# Patient Record
Sex: MALE | Race: WHITE | NOT HISPANIC OR LATINO | Employment: UNEMPLOYED | URBAN - NONMETROPOLITAN AREA
[De-identification: names, ages, dates, MRNs, and addresses within clinical notes are randomized per-mention and may not be internally consistent; named-entity substitution may affect disease eponyms.]

---

## 2022-05-11 ENCOUNTER — APPOINTMENT (OUTPATIENT)
Dept: GENERAL RADIOLOGY | Facility: HOSPITAL | Age: 57
End: 2022-05-11

## 2022-05-11 ENCOUNTER — HOSPITAL ENCOUNTER (EMERGENCY)
Facility: HOSPITAL | Age: 57
Discharge: HOME | End: 2022-05-11
Attending: EMERGENCY MEDICINE | Admitting: EMERGENCY MEDICINE

## 2022-05-11 VITALS
OXYGEN SATURATION: 96 % | TEMPERATURE: 98.2 F | DIASTOLIC BLOOD PRESSURE: 83 MMHG | HEART RATE: 96 BPM | BODY MASS INDEX: 31.5 KG/M2 | WEIGHT: 220 LBS | RESPIRATION RATE: 18 BRPM | HEIGHT: 70 IN | SYSTOLIC BLOOD PRESSURE: 108 MMHG

## 2022-05-11 DIAGNOSIS — S61.210A LACERATION OF RIGHT INDEX FINGER WITHOUT FOREIGN BODY WITHOUT DAMAGE TO NAIL, INITIAL ENCOUNTER: Primary | ICD-10-CM

## 2022-05-11 PROCEDURE — 73140 X-RAY EXAM OF FINGER(S): CPT

## 2022-05-11 PROCEDURE — 99283 EMERGENCY DEPT VISIT LOW MDM: CPT

## 2022-05-11 RX ORDER — IBUPROFEN 200 MG
1 TABLET ORAL ONCE
Status: COMPLETED | OUTPATIENT
Start: 2022-05-11 | End: 2022-05-11

## 2022-05-11 RX ORDER — HYDROCODONE BITARTRATE AND ACETAMINOPHEN 5; 325 MG/1; MG/1
1 TABLET ORAL ONCE
Status: COMPLETED | OUTPATIENT
Start: 2022-05-11 | End: 2022-05-11

## 2022-05-11 RX ADMIN — Medication 1 APPLICATION: at 16:23

## 2022-05-11 RX ADMIN — HYDROCODONE BITARTRATE AND ACETAMINOPHEN 1 TABLET: 5; 325 TABLET ORAL at 15:14

## 2022-05-11 NOTE — ED PROVIDER NOTES
Subjective   56-year-old male presents with an injury to his right small finger on the palm surface.  He states that he injured it with a .  The high flow water pressure ran across his small finger on the palm surface pushing out the skin and tissue and he now has a numbness sensation in the tip of the finger that he cannot bend the finger.      History provided by:  Patient   used: No        Review of Systems   Musculoskeletal:        Finger injury right small finger palm surface   All other systems reviewed and are negative.      Past Medical History:   Diagnosis Date   • Disease of thyroid gland    • Hyperlipidemia    • Hypertension        No Known Allergies    History reviewed. No pertinent surgical history.    History reviewed. No pertinent family history.    Social History     Socioeconomic History   • Marital status:    Tobacco Use   • Smoking status: Current Every Day Smoker     Packs/day: 0.50     Types: Cigarettes   Vaping Use   • Vaping Use: Never used   Substance and Sexual Activity   • Alcohol use: Not Currently   • Drug use: Never   • Sexual activity: Defer           Objective   Physical Exam  Vitals and nursing note reviewed.   Constitutional:       Appearance: He is well-developed.   HENT:      Head: Normocephalic and atraumatic.   Cardiovascular:      Rate and Rhythm: Normal rate and regular rhythm.   Pulmonary:      Effort: Pulmonary effort is normal.      Breath sounds: Normal breath sounds.   Musculoskeletal:        Hands:       Cervical back: Normal range of motion.      Comments: Open wound 1 cm palm surface right small finger   Skin:     General: Skin is warm and dry.   Neurological:      Mental Status: He is alert and oriented to person, place, and time.   Psychiatric:         Behavior: Behavior normal.         Thought Content: Thought content normal.         Judgment: Judgment normal.         Procedures           ED Course         laceration repair  not performed                                        MDM  Number of Diagnoses or Management Options  Laceration of right index finger without foreign body without damage to nail, initial encounter: new and requires workup     Amount and/or Complexity of Data Reviewed  Tests in the radiology section of CPT®: reviewed    Risk of Complications, Morbidity, and/or Mortality  Presenting problems: minimal  Management options: minimal    Patient Progress  Patient progress: stable      Final diagnoses:   Laceration of right index finger without foreign body without damage to nail, initial encounter       ED Disposition  ED Disposition     ED Disposition   Discharge    Condition   Stable    Comment   --             [facility] Emergency Department  [address] [facility] [address] [phone]    If symptoms worsen    Geronimo Larkin MD  [address] [id number] [phone]    Schedule an appointment as soon as possible for a visit            Medication List      No changes were made to your prescriptions during this visit.          Joel Dominique Jr., PA-C  05/11/22 1638       Joel Dominique Jr., PA-C  05/17/22 105

## 2023-02-14 ENCOUNTER — TRANSCRIBE ORDERS (OUTPATIENT)
Dept: NEUROLOGY | Facility: HOSPITAL | Age: 58
End: 2023-02-14
Payer: MEDICAID

## 2023-02-14 DIAGNOSIS — N18.32 STAGE 3B CHRONIC KIDNEY DISEASE: Primary | ICD-10-CM

## 2023-04-05 ENCOUNTER — HOSPITAL ENCOUNTER (OUTPATIENT)
Dept: ULTRASOUND IMAGING | Facility: HOSPITAL | Age: 58
Discharge: HOME | End: 2023-04-05
Admitting: NURSE PRACTITIONER
Payer: MEDICAID

## 2023-04-05 DIAGNOSIS — N18.32 STAGE 3B CHRONIC KIDNEY DISEASE: ICD-10-CM

## 2023-04-05 PROCEDURE — 76775 US EXAM ABDO BACK WALL LIM: CPT

## 2024-06-24 ENCOUNTER — HOSPITAL ENCOUNTER (EMERGENCY)
Facility: HOSPITAL | Age: 59
Discharge: HOME | End: 2024-06-24
Attending: EMERGENCY MEDICINE | Admitting: EMERGENCY MEDICINE
Payer: MEDICAID

## 2024-06-24 VITALS
DIASTOLIC BLOOD PRESSURE: 82 MMHG | RESPIRATION RATE: 18 BRPM | HEIGHT: 70 IN | BODY MASS INDEX: 28.92 KG/M2 | WEIGHT: 202 LBS | SYSTOLIC BLOOD PRESSURE: 128 MMHG | HEART RATE: 64 BPM | TEMPERATURE: 98.3 F | OXYGEN SATURATION: 98 %

## 2024-06-24 DIAGNOSIS — K04.7 DENTAL ABSCESS: Primary | ICD-10-CM

## 2024-06-24 PROCEDURE — 96372 THER/PROPH/DIAG INJ SC/IM: CPT

## 2024-06-24 PROCEDURE — 25010000002 KETOROLAC TROMETHAMINE PER 15 MG: Performed by: EMERGENCY MEDICINE

## 2024-06-24 PROCEDURE — 99283 EMERGENCY DEPT VISIT LOW MDM: CPT

## 2024-06-24 RX ORDER — KETOROLAC TROMETHAMINE 10 MG/1
10 TABLET, FILM COATED ORAL EVERY 6 HOURS PRN
Qty: 15 TABLET | Refills: 0 | Status: SHIPPED | OUTPATIENT
Start: 2024-06-24

## 2024-06-24 RX ORDER — PENICILLIN V POTASSIUM 250 MG/1
500 TABLET ORAL ONCE
Status: DISCONTINUED | OUTPATIENT
Start: 2024-06-24 | End: 2024-06-24

## 2024-06-24 RX ORDER — AMOXICILLIN AND CLAVULANATE POTASSIUM 875; 125 MG/1; MG/1
1 TABLET, FILM COATED ORAL ONCE
Status: COMPLETED | OUTPATIENT
Start: 2024-06-24 | End: 2024-06-24

## 2024-06-24 RX ORDER — KETOROLAC TROMETHAMINE 30 MG/ML
30 INJECTION, SOLUTION INTRAMUSCULAR; INTRAVENOUS ONCE
Status: COMPLETED | OUTPATIENT
Start: 2024-06-24 | End: 2024-06-24

## 2024-06-24 RX ORDER — PENICILLIN V POTASSIUM 500 MG/1
500 TABLET ORAL 4 TIMES DAILY
Qty: 40 TABLET | Refills: 0 | Status: SHIPPED | OUTPATIENT
Start: 2024-06-24 | End: 2024-07-04

## 2024-06-24 RX ORDER — LIDOCAINE HYDROCHLORIDE 20 MG/ML
5 SOLUTION OROPHARYNGEAL
Status: DISCONTINUED | OUTPATIENT
Start: 2024-06-24 | End: 2024-06-25 | Stop reason: HOSPADM

## 2024-06-24 RX ORDER — LIDOCAINE HYDROCHLORIDE 20 MG/ML
SOLUTION OROPHARYNGEAL
Status: COMPLETED
Start: 2024-06-24 | End: 2024-06-24

## 2024-06-24 RX ADMIN — LIDOCAINE HYDROCHLORIDE: 20 SOLUTION ORAL at 22:53

## 2024-06-24 RX ADMIN — LIDOCAINE HYDROCHLORIDE: 20 SOLUTION OROPHARYNGEAL at 22:53

## 2024-06-24 RX ADMIN — KETOROLAC TROMETHAMINE 30 MG: 30 INJECTION, SOLUTION INTRAMUSCULAR; INTRAVENOUS at 22:34

## 2024-06-24 RX ADMIN — AMOXICILLIN AND CLAVULANATE POTASSIUM 1 TABLET: 875; 125 TABLET, FILM COATED ORAL at 22:34

## 2024-06-25 NOTE — FSED PROVIDER NOTE
Subjective   History of Present Illness  Patient presents to the emergency department for left upper sided dental pain.  Pain began yesterday.  Has a history of dental caries and says that he does not see a dentist in years.  He denies nausea or vomiting no change in voice.  No change in vision.  Says his face felt swollen.  He denies any difficulty breathing or other symptoms no known trauma.  Does not feel any teeth break or other injury    History provided by:  Patient   used: No        Review of Systems   HENT:  Positive for dental problem.    All other systems reviewed and are negative.      Past Medical History:   Diagnosis Date    Disease of thyroid gland     Hyperlipidemia     Hypertension        No Known Allergies    History reviewed. No pertinent surgical history.    History reviewed. No pertinent family history.    Social History     Socioeconomic History    Marital status:    Tobacco Use    Smoking status: Every Day     Current packs/day: 0.50     Types: Cigarettes   Vaping Use    Vaping status: Never Used   Substance and Sexual Activity    Alcohol use: Not Currently    Drug use: Never    Sexual activity: Defer           Objective   Physical Exam  Vitals and nursing note reviewed.   Constitutional:       Appearance: Normal appearance.   HENT:      Head: Normocephalic and atraumatic.      Nose: Nose normal.      Mouth/Throat:      Mouth: Mucous membranes are moist.      Dentition: Gingival swelling (Left upper molars, mild fluctuance) and dental caries (Severe dental caries diffusely with multiple missing teeth) present.      Tongue: No lesions. Tongue does not deviate from midline.      Pharynx: Oropharynx is clear.   Eyes:      Extraocular Movements: Extraocular movements intact.      Pupils: Pupils are equal, round, and reactive to light.   Cardiovascular:      Rate and Rhythm: Normal rate and regular rhythm.      Pulses: Normal pulses.      Heart sounds: Normal heart sounds.    Pulmonary:      Effort: No respiratory distress.      Breath sounds: Normal breath sounds. No stridor.   Abdominal:      General: There is no distension.      Palpations: Abdomen is soft.      Tenderness: There is no abdominal tenderness. There is no guarding.   Musculoskeletal:         General: No swelling, tenderness or deformity. Normal range of motion.      Cervical back: Full passive range of motion without pain, normal range of motion and neck supple.   Skin:     General: Skin is warm and dry.      Capillary Refill: Capillary refill takes less than 2 seconds.   Neurological:      General: No focal deficit present.      Mental Status: He is alert and oriented to person, place, and time.      Cranial Nerves: No cranial nerve deficit.   Psychiatric:         Mood and Affect: Mood normal.         Behavior: Behavior normal.         Incision & Drainage    Date/Time: 6/24/2024 10:28 PM    Performed by: Elmer Ly MD  Authorized by: Elmer Ly MD    Consent:     Consent obtained:  Verbal    Consent given by:  Patient    Risks discussed:  Incomplete drainage, bleeding and pain    Alternatives discussed:  No treatment and referral  Universal protocol:     Procedure explained and questions answered to patient or proxy's satisfaction: yes      Relevant documents present and verified: yes      Test results available : yes      Imaging studies available: yes      Required blood products, implants, devices, and special equipment available: yes      Site/side marked: yes      Immediately prior to procedure, a time out was called: yes      Patient identity confirmed:  Verbally with patient  Location:     Type:  Abscess    Size:  0.5 cm    Location: Left upper gingiva.  Sedation:     Sedation type:  None  Anesthesia:     Anesthesia method:  Topical application  Procedure type:     Complexity:  Simple  Procedure details:     Incision types:  Stab incision    Incision depth:  Subcutaneous    Drainage:  Purulent     Drainage amount:  Scant    Packing materials:  None  Post-procedure details:     Procedure completion:  Tolerated             ED Course                                           Medical Decision Making  Hemodynamically stable and afebrile.  Patient has a small gingival abscess.  This was drained at bedside.  He has poor dentition will need follow-up with dentistry.  Will give antibiotic first dose here as well as Toradol.  Given pain control and antibiotics for home with follow-up instructions.  Discharge    Risk  Prescription drug management.        Final diagnoses:   None       ED Disposition  ED Disposition       ED Disposition   Discharge    Condition   Stable    Comment   --               Ba Adams APRN  [address] [place] [address]  [phone]    Schedule an appointment as soon as possible for a visit       [facility] EMERGENCY DEPARTMENT [place]  [address]  [facility] [address] [phone]  Go to   As needed     DENTAL CLINIC  [address]  [facility] [id number] [phone]  Go in 2 days           Medication List        New Prescriptions      ketorolac 10 MG tablet  Commonly known as: TORADOL  Take 1 tablet by mouth Every 6 (Six) Hours As Needed for Moderate Pain.     penicillin v potassium 500 MG tablet  Commonly known as: VEETID  Take 1 tablet by mouth 4 (Four) Times a Day for 10 days.               Where to Get Your Medications        These medications were sent to [facility] [place] - [address] - [phone]  - [phone] FX  [address] [place] [address]      Phone: [phone]   ketorolac 10 MG tablet  penicillin v potassium 500 MG tablet

## 2025-02-28 ENCOUNTER — TRANSCRIBE ORDERS (OUTPATIENT)
Dept: ADMINISTRATIVE | Facility: HOSPITAL | Age: 60
End: 2025-02-28
Payer: MEDICAID

## 2025-02-28 ENCOUNTER — HOSPITAL ENCOUNTER (OUTPATIENT)
Dept: GENERAL RADIOLOGY | Facility: HOSPITAL | Age: 60
Discharge: HOME | End: 2025-02-28
Payer: MEDICAID

## 2025-02-28 DIAGNOSIS — R52 PAIN: Primary | ICD-10-CM

## 2025-02-28 DIAGNOSIS — R52 PAIN: ICD-10-CM

## 2025-02-28 PROCEDURE — 73630 X-RAY EXAM OF FOOT: CPT

## 2025-03-05 ENCOUNTER — OFFICE VISIT (OUTPATIENT)
Dept: UROLOGY | Facility: CLINIC | Age: 60
End: 2025-03-05
Payer: MEDICAID

## 2025-03-05 ENCOUNTER — LAB (OUTPATIENT)
Dept: LAB | Facility: HOSPITAL | Age: 60
End: 2025-03-05
Payer: MEDICAID

## 2025-03-05 VITALS
WEIGHT: 224.8 LBS | SYSTOLIC BLOOD PRESSURE: 120 MMHG | BODY MASS INDEX: 32.18 KG/M2 | OXYGEN SATURATION: 93 % | HEART RATE: 90 BPM | TEMPERATURE: 97.9 F | RESPIRATION RATE: 16 BRPM | DIASTOLIC BLOOD PRESSURE: 62 MMHG | HEIGHT: 70 IN

## 2025-03-05 DIAGNOSIS — R31.29 MICROSCOPIC HEMATURIA: ICD-10-CM

## 2025-03-05 DIAGNOSIS — Z72.0 TOBACCO USE: ICD-10-CM

## 2025-03-05 DIAGNOSIS — R31.29 MICROSCOPIC HEMATURIA: Primary | ICD-10-CM

## 2025-03-05 PROBLEM — E55.9 VITAMIN D DEFICIENCY DUE TO CHRONIC KIDNEY DISEASE: Status: ACTIVE | Noted: 2023-02-07

## 2025-03-05 PROBLEM — N25.81 SECONDARY HYPERPARATHYROIDISM OF RENAL ORIGIN: Chronic | Status: ACTIVE | Noted: 2023-02-07

## 2025-03-05 PROBLEM — E78.5 DYSLIPIDEMIA: Status: ACTIVE | Noted: 2023-02-06

## 2025-03-05 PROBLEM — G89.4 CHRONIC PAIN DISORDER: Status: ACTIVE | Noted: 2023-02-07

## 2025-03-05 PROBLEM — E11.22 TYPE 2 DIABETES MELLITUS WITH DIABETIC CHRONIC KIDNEY DISEASE: Chronic | Status: ACTIVE | Noted: 2023-02-06

## 2025-03-05 PROBLEM — R60.0 PERIPHERAL EDEMA: Status: ACTIVE | Noted: 2023-02-06

## 2025-03-05 PROBLEM — I12.9 BENIGN HYPERTENSION WITH CHRONIC KIDNEY DISEASE: Status: ACTIVE | Noted: 2023-02-06

## 2025-03-05 PROBLEM — N18.9 VITAMIN D DEFICIENCY DUE TO CHRONIC KIDNEY DISEASE: Status: ACTIVE | Noted: 2023-02-07

## 2025-03-05 PROBLEM — N18.32 STAGE 3B CHRONIC KIDNEY DISEASE: Status: ACTIVE | Noted: 2023-02-06

## 2025-03-05 PROBLEM — M54.50 LOW BACK PAIN: Status: ACTIVE | Noted: 2024-09-24

## 2025-03-05 PROBLEM — I82.409 DEEP VENOUS THROMBOSIS: Status: ACTIVE | Noted: 2023-02-07

## 2025-03-05 LAB
BILIRUB BLD-MCNC: ABNORMAL MG/DL
CLARITY, POC: CLEAR
COLOR UR: YELLOW
EXPIRATION DATE: ABNORMAL
GLUCOSE UR STRIP-MCNC: NEGATIVE MG/DL
KETONES UR QL: ABNORMAL
LEUKOCYTE EST, POC: NEGATIVE
Lab: ABNORMAL
NITRITE UR-MCNC: NEGATIVE MG/ML
PH UR: 5.5 [PH] (ref 5–8)
PROT UR STRIP-MCNC: ABNORMAL MG/DL
RBC # UR STRIP: ABNORMAL /UL
SP GR UR: 1.02 (ref 1–1.03)
UROBILINOGEN UR QL: NORMAL

## 2025-03-05 PROCEDURE — 84153 ASSAY OF PSA TOTAL: CPT

## 2025-03-05 PROCEDURE — 36415 COLL VENOUS BLD VENIPUNCTURE: CPT

## 2025-03-05 RX ORDER — OMEGA-3 FATTY ACIDS/FISH OIL 300-1000MG
CAPSULE ORAL
COMMUNITY

## 2025-03-05 RX ORDER — HYDROCODONE BITARTRATE AND ACETAMINOPHEN 7.5; 325 MG/1; MG/1
TABLET ORAL
COMMUNITY
Start: 2024-11-26

## 2025-03-05 RX ORDER — SIMVASTATIN 20 MG
TABLET ORAL
COMMUNITY

## 2025-03-05 RX ORDER — HYDROCHLOROTHIAZIDE 25 MG/1
TABLET ORAL
COMMUNITY

## 2025-03-05 RX ORDER — LEVOTHYROXINE SODIUM 75 UG/1
75 TABLET ORAL
COMMUNITY
Start: 2025-03-04

## 2025-03-05 RX ORDER — LOSARTAN POTASSIUM 100 MG/1
100 TABLET ORAL DAILY
COMMUNITY

## 2025-03-05 NOTE — PROGRESS NOTES
Office Visit     Patient Name: Adrián Buenrostro  : 1965   MRN: [id number]   Patient or patient representative verbalized consent for the use of Ambient Listening during the visit with  TREVIN Lainez for chart documentation. 3/5/2025  15:24 EST    Chief Complaint   Patient presents with    microscopic hematuria    Establish Care     Referring Provider: No ref. provider found    Primary Care Provider: Esequiel Liang MD     History of Present Illness  The patient is a 59-year-old male presenting for evaluation of microscopic hematuria.    Microscopic Hematuria  - Referred by his nephrologist for microscopic hematuria. Last microscopic urinalysis was negative in December.  - No gross hematuria   - No history of renal calculi.  - Last imaging study 2 years ago.    Smoking History  - Smoker since early adulthood, half a pack per day.  - No secondhand smoke exposure.  - No vapor cigarette use.  - No smoking cessation attempts.    Supplemental information: No known PSA test.    FAMILY HISTORY  Father had prostate cancer diagnosed in late 60s. No family history of bladder or kidney cancer.     Subjective   Review of System: As noted in HPI.    Past Medical History:   Diagnosis Date    Chronic kidney disease     Disease of thyroid gland     Hyperlipidemia     Hypertension      History reviewed. No pertinent surgical history.  Family History   Problem Relation Age of Onset    Prostate cancer Father 68    Kidney disease Mother     Kidney cancer Neg Hx         no bladder cancer    Testicular cancer Neg Hx      Social History     Socioeconomic History    Marital status:    Tobacco Use    Smoking status: Every Day     Current packs/day: 0.50     Average packs/day: 0.5 packs/day for 35.2 years (17.6 ttl pk-yrs)     Types: Cigarettes     Start date:      Passive exposure: Past    Smokeless tobacco: Never   Vaping Use    Vaping status: Never Used   Substance and Sexual Activity    Alcohol use: Not  "Currently    Drug use: Never    Sexual activity: Not Currently     Partners: Female       Current Outpatient Medications:     hydroCHLOROthiazide 25 MG tablet, take one (1) tablet by mouth every day, Disp: , Rfl:     HYDROcodone-acetaminophen (NORCO) 7.5-325 MG per tablet, Take 1 tablet twice a day by oral route as needed for 30 days, for pain., Disp: , Rfl:     levothyroxine (SYNTHROID, LEVOTHROID) 75 MCG tablet, Take 1 tablet by mouth Every Morning., Disp: , Rfl:     losartan (COZAAR) 100 MG tablet, Take 1 tablet by mouth Daily., Disp: , Rfl:     Rivaroxaban (XARELTO PO), Take  by mouth., Disp: , Rfl:     simvastatin (ZOCOR) 20 MG tablet, take one (1) tablet by mouth every day in the evening, Disp: , Rfl:     Ibuprofen 200 MG capsule, Take  by mouth. (Patient not taking: Reported on 3/5/2025), Disp: , Rfl:     No Known Allergies  Objective   Visit Vitals  /62 (BP Location: Left arm, Patient Position: Sitting, Cuff Size: Adult)   Pulse 90   Temp 97.9 °F (36.6 °C) (Temporal)   Resp 16   Ht 176.6 cm (69.53\")   Wt 102 kg (224 lb 12.8 oz)   SpO2 93%   BMI 32.69 kg/m²      Body mass index is 32.69 kg/m².   Physical Exam  Vitals and nursing note reviewed.   Constitutional:       General: He is not in acute distress.     Appearance: Normal appearance. He is not ill-appearing.   HENT:      Head: Normocephalic and atraumatic.      Mouth/Throat:      Lips: Pink.      Dentition: Abnormal dentition (missing teeth).   Pulmonary:      Effort: Pulmonary effort is normal.   Skin:     General: Skin is warm and dry.   Neurological:      General: No focal deficit present.      Mental Status: He is alert and oriented to person, place, and time.   Psychiatric:         Mood and Affect: Mood normal.         Behavior: Behavior normal.      Labs  No results found for: \"COLORU\", \"CLARITYU\", \"SPECGRAV\", \"PHUR\", \"LEUKOCYTESUR\", \"NITRITE\", \"PROTEINPOCUA\", \"GLUCOSEUR\", \"KETONESU\", \"UROBILINOGEN\", \"BILIRUBINUR\", \"RBCUR\"   No results found " "for: \"WBCUA\", \"RBCUA\", \"BACTERIA\", \"HYALCASTU\", \"SQUAMEPIUA\"     No results found for: \"WBC\", \"HGB\", \"HCT\", \"MCV\", \"PLT\"  No results found for: \"GLUCOSE\", \"CALCIUM\", \"NA\", \"K\", \"CO2\", \"CL\", \"BUN\", \"CREATININE\", \"EGFR\", \"BCR\", \"ANIONGAP\", \"ALT\", \"AST\"  No results found for: \"HGBA1C\"  No results found for: \"PSA\", \"PCTFREEPSA\", \"PROSTATEBIO\"  No results found for: \"URICACIDSTN\", \"EDTU8OAFWKF\", \"KNDE5FLOUF\", \"LABMAGN\"  No results found for: \"GAZG02AU\", \"CAION\", \"PTH\", \"URICACID\"  No results found for: \"CYSTINE\", \"URINEVOLUM\", \"CALCIUMUR\", \"OXALATEU\", \"CITRATEUR\", \"LABPH\", \"URICUR\", \"NAUR\", \"KUR\", \"MAGNESIUMUR\", \"PHOSUR\", \"AMMONIUMUR\", \"CLUR\", \"AUJCXBZYK94S\", \"UREAUR\", \"LABCREAUR\"  No results found for: \"TESTOSTEROTT\", \"TESTFRE\", \"PSA\", \"ESTRADIOL\", \"LH\", \"PROLACTIN\", \"FSH\"    Radiographic Studies  XR Foot 3+ View Right    Result Date: 2/28/2025  Chronic change of the calcaneus as described. No prior exam for comparison.  No acute osseous abnormality.    Images were reviewed, interpreted, and dictated by Dr. Ольга Vila MD Transcribed by Fatimah Gonzalez PA-C.  This report was signed and finalized on 2/28/2025 11:00 AM by Ольга Vila MD.      I have reviewed the above labs and imaging.    Assessment / Plan    Diagnoses and all orders for this visit:    1. Microscopic hematuria (Primary)  -     POC Urinalysis Dipstick, Automated  -     PSA Diagnostic; Future  -     Urinalysis With Microscopic - Urine, Clean Catch; Future    2. Tobacco use       Assessment & Plan  1. Microscopic hematuria  - History of microscopic hematuria, recent urinalysis shows blood  - Previous urine microscopy in December was negative, October's was contaminated  - Increased risk of urothelial cancer due to 17 pack-years smoking history and second hand smoke exposure.    - Repeat urine microscopy to confirm blood and rule out contamination  - If positive, recommend imaging for masses, tumors, or kidney stones  - Coordinate with Dr. Schwartz's group " for pre and post-hydration if imaging required    2. Increased risk of prostate cancer  - Increased risk due to family history (father diagnosed in late 60s)  - PSA normal in 2022 per provider note. No labs to review and confirm.    - Order repeat PSA test today  - No digital rectal exam to avoid falsely elevating PSA    3. Smoking  - 17 pack-years smoking history, no smoking cessation attempts  - Discussed risks including bladder cancer  - Encouraged smoking cessation       Return for f/u with Brenna on Monday of next week to review labs.  .    Brenna Martinez, MSN, APRN, FNP-C  [facility]

## 2025-03-06 LAB — PSA SERPL-MCNC: 0.82 NG/ML (ref 0–4)

## 2025-03-10 ENCOUNTER — OFFICE VISIT (OUTPATIENT)
Dept: UROLOGY | Facility: CLINIC | Age: 60
End: 2025-03-10
Payer: MEDICAID

## 2025-03-10 VITALS
RESPIRATION RATE: 14 BRPM | TEMPERATURE: 97.2 F | WEIGHT: 224 LBS | BODY MASS INDEX: 32.58 KG/M2 | HEART RATE: 85 BPM | OXYGEN SATURATION: 96 %

## 2025-03-10 DIAGNOSIS — Z80.42 FAMILY HISTORY OF PROSTATE CANCER IN FATHER: ICD-10-CM

## 2025-03-10 DIAGNOSIS — R31.9 HEMATURIA, UNSPECIFIED TYPE: Primary | ICD-10-CM

## 2025-03-10 DIAGNOSIS — Z79.01 ANTICOAGULANT LONG-TERM USE: ICD-10-CM

## 2025-03-10 DIAGNOSIS — Z72.0 TOBACCO USE: ICD-10-CM

## 2025-03-10 NOTE — PROGRESS NOTES
Office Visit     Patient Name: Adrián Buenrostro  : 1965   MRN: [id number]   Patient or patient representative verbalized consent for the use of Ambient Listening during the visit with  TREVIN Lainez for chart documentation. 3/10/2025  08:02 EDT    Chief Complaint   Patient presents with    Results    Blood in Urine     Referring Provider: No ref. provider found    Primary Care Provider: Esequiel Liang MD     History of Present Illness  The patient is a 59-year-old male here to review recent labs, including PSA and urine microscopy.    Microscopic Hematuria  - History of microscopic hematuria with negative urine microscopies in the past.  No gross hematuria.    - On Xarelto     Family history of prostate cancer   - Family history of prostate cancer (father diagnosed in his 60s)    Chronic Back Pain  - Reports chronic back pain currently taking a Medrol dose pack.       Subjective   Review of System: As noted in HPI.    Past Medical History:   Diagnosis Date    Chronic kidney disease     Disease of thyroid gland     Hyperlipidemia     Hypertension      History reviewed. No pertinent surgical history.  Family History   Problem Relation Age of Onset    Prostate cancer Father 68    Kidney disease Mother     Kidney cancer Neg Hx         no bladder cancer    Testicular cancer Neg Hx      Social History     Socioeconomic History    Marital status:    Tobacco Use    Smoking status: Every Day     Current packs/day: 0.50     Average packs/day: 0.5 packs/day for 35.2 years (17.6 ttl pk-yrs)     Types: Cigarettes     Start date: 1990     Passive exposure: Past    Smokeless tobacco: Never   Vaping Use    Vaping status: Never Used   Substance and Sexual Activity    Alcohol use: Not Currently    Drug use: Never    Sexual activity: Not Currently     Partners: Female       Current Outpatient Medications:     hydroCHLOROthiazide 25 MG tablet, take one (1) tablet by mouth every day, Disp: , Rfl:      HYDROcodone-acetaminophen (NORCO) 7.5-325 MG per tablet, Take 1 tablet twice a day by oral route as needed for 30 days, for pain., Disp: , Rfl:     levothyroxine (SYNTHROID, LEVOTHROID) 75 MCG tablet, Take 1 tablet by mouth Every Morning., Disp: , Rfl:     losartan (COZAAR) 100 MG tablet, Take 1 tablet by mouth Daily., Disp: , Rfl:     Rivaroxaban (XARELTO PO), Take  by mouth., Disp: , Rfl:     simvastatin (ZOCOR) 20 MG tablet, take one (1) tablet by mouth every day in the evening, Disp: , Rfl:     Ibuprofen 200 MG capsule, Take  by mouth. (Patient not taking: Reported on 3/10/2025), Disp: , Rfl:     No Known Allergies  Objective   Visit Vitals  Pulse 85   Temp 97.2 °F (36.2 °C) (Temporal)   Resp 14   Wt 102 kg (224 lb)   SpO2 96%   BMI 32.58 kg/m²      Body mass index is 32.58 kg/m².   Physical Exam  Vitals and nursing note reviewed.   Constitutional:       General: He is not in acute distress.     Appearance: Normal appearance. He is overweight. He is not ill-appearing.   HENT:      Head: Normocephalic and atraumatic.   Pulmonary:      Effort: Pulmonary effort is normal.   Skin:     General: Skin is warm and dry.   Neurological:      General: No focal deficit present.      Mental Status: He is alert and oriented to person, place, and time.   Psychiatric:         Mood and Affect: Mood normal.         Behavior: Behavior normal.     Labs  Lab Results   Component Value Date    COLORU Yellow 03/05/2025    CLARITYU Clear 03/05/2025    SPECGRAV 1.020 03/05/2025    PHUR 5.5 03/05/2025    LEUKOCYTESUR Negative 03/05/2025    NITRITE Negative 03/05/2025    PROTEINPOCUA 2+ (A) 03/05/2025    GLUCOSEUR Negative 03/05/2025    KETONESU 15 mg/dL (A) 03/05/2025    UROBILINOGEN Normal 03/05/2025    BILIRUBINUR Small (1+) (A) 03/05/2025    RBCUR Small (A) 03/05/2025     Lab Results   Component Value Date    PSA 0.825 03/05/2025     Radiographic Studies  XR Foot 3+ View Right  Result Date: 2/28/2025  Chronic change of the calcaneus as  described. No prior exam for comparison.  No acute osseous abnormality.    Images were reviewed, interpreted, and dictated by Dr. Ольга Vila MD Transcribed by Fatimah Gonzalez PA-C.  This report was signed and finalized on 2/28/2025 11:00 AM by Ольга Vila MD.      I have reviewed the above labs and imaging.     Assessment / Plan    Diagnoses and all orders for this visit:    1. Hematuria, unspecified type (Primary)    2. Tobacco use    3. Family history of prostate cancer in father    4. Anticoagulant long-term use       Assessment & Plan  1. Microscopic hematuria  - Urine microscopy consistently negative in the past, no visible hematuria. On chronic anticoagulation therapy: Xarelto.   - Mr. Buenrostro was unable to provide a urine specimen today for recollection.    - Advised to provide urine specimen at [facility] to repeat the urine microscopy.  It was collected last week but there are no results to review and no evidence that it was done.    - Results will be communicated  - If positive, informed of next steps  - If negative, advised to cease smoking due to bladder cancer risk    2. Family history of prostate cancer  - PSA levels normal, no immediate concern  - Continue annual PSA checks until age 71, then evaluate necessity  - Tests can be conducted by our clinic or primary care physician    3. Tobacco use  - encouraged smoking cessation.  Not interested in quitting at this time.     4. Anticoagulant therapy   - On long term Xarelto      Return for f/u with Brenna pending urine microscopy results as discussed in plan. .    Brenna Martinez, MSN, APRN, FNP-C  [facility]     no

## 2025-03-28 ENCOUNTER — TELEPHONE (OUTPATIENT)
Dept: UROLOGY | Facility: CLINIC | Age: 60
End: 2025-03-28
Payer: MEDICAID

## 2025-03-28 NOTE — TELEPHONE ENCOUNTER
"Caller: Adrián Buenrostro \"Jaden\"    Relationship: Self    Best call back number:   Telephone Information:   Mobile [phone]      Caller requesting test results: PATIENT    What test was performed: UA    When was the test performed: 03/10/2025    Where was the test performed: OFFICE    Additional notes: PATIENT CALLING FOR RESULTS OF URINE TEST COMPLETED AT THE OFFICE FOLLOWING HIS LAST OFFICE VISIT ON 03/10/25.  HE STATED THAT HE WAS ABLE TO COME BACK  INTO THE OFFICE AFTER HE LEFT HIS VISIT TO LEAVE A URINE SAMPLE AND HE THOUGHT SOMEONE WAS GOING TO CALL HIM BACK WITH THE RESULTS.  REQUESTING A CALL BACK.  THANK  YOU.  "

## 2025-04-04 NOTE — TELEPHONE ENCOUNTER
I left  for patient stating microscopy is normal, no blood and he can schedule sooner follow up if needed.

## 2025-04-11 ENCOUNTER — OFFICE VISIT (OUTPATIENT)
Dept: NEUROSURGERY | Facility: CLINIC | Age: 60
End: 2025-04-11
Payer: MEDICAID

## 2025-04-11 VITALS — BODY MASS INDEX: 32.47 KG/M2 | HEIGHT: 70 IN | TEMPERATURE: 97.8 F | WEIGHT: 226.8 LBS

## 2025-04-11 DIAGNOSIS — Z72.0 TOBACCO ABUSE: ICD-10-CM

## 2025-04-11 DIAGNOSIS — M54.9 MECHANICAL BACK PAIN: Primary | ICD-10-CM

## 2025-04-11 DIAGNOSIS — M51.9 LUMBAR DISC DISEASE: ICD-10-CM

## 2025-04-11 DIAGNOSIS — M47.819 FACET ARTHROPATHY: ICD-10-CM

## 2025-04-11 PROCEDURE — 99203 OFFICE O/P NEW LOW 30 MIN: CPT | Performed by: NEUROLOGICAL SURGERY

## 2025-04-11 PROCEDURE — 1160F RVW MEDS BY RX/DR IN RCRD: CPT | Performed by: NEUROLOGICAL SURGERY

## 2025-04-11 PROCEDURE — 1159F MED LIST DOCD IN RCRD: CPT | Performed by: NEUROLOGICAL SURGERY

## 2025-04-11 NOTE — PROGRESS NOTES
Patient: Adrián Buenrostro  : 1965    Primary Care Provider: Esequiel Liang MD    Requesting Provider: As above          History of Present Illness  The patient presents for evaluation of low back pain.    Mr. Buenrostro has been experiencing persistent low back pain for over a year, which he reports as having progressively worsened since his surgery in . The pain, initially localized to the lower back, has recently begun to radiate down to his right hip. He reports no associated numbness or weakness. The pain is unrelenting, with no specific positions or activities providing relief. However, he notes an exacerbation of the pain during movement and daily activities such as bed-making or meal preparation. Previous treatments have included rhizotomies, which unfortunately did not provide any relief. He also attempted physical therapy but discontinued after two sessions due to subsequent bed rest for three days.    SOCIAL HISTORY  The patient smokes half a pack a day.      Review of Systems    The patient's past medical history, past surgical history, family history, and social history have been reviewed at length in the electronic medical record.      Physical Exam:   CONSTITUTIONAL: Patient is well-nourished, pleasant and appears stated age.  MUSCULOSKELETAL:  Straight leg raising is negative.  Ghulam's Sign is negative.  ROM in the low back is limited in all directions.  Tenderness in the back to palpation is not observed.  NEUROLOGICAL:  Orientation, memory, attention span, language function, and cognition have been examined and are intact.  Strength is intact in the lower extremities to direct testing.  Muscle tone is normal throughout.  Station and gait are normal.  Sensation is intact to light touch testing throughout.  Deep tendon reflexes are difficult to elicit throughout.  Coordination is intact.      Medical Decision Making    Data Review:   (All imaging studies were personally reviewed unless stated  otherwise)  Results  MRI of the lumbar spine dated 3/4/2025 demonstrates diffuse degenerative disc disease.  There is diffuse facet arthropathy.  There is some central disc bulging at L4-5.  There is probably a laminotomy defect on the right at L5-S1    MRI of the lumbar spine dated 4/26/2024 demonstrates the same findings.  The laminotomy defect on the right at L5-S1 is better defined.      Diagnosis:   Chronic mechanical low back pain.    Treatment Options:   Presently, there is no role for surgical intervention.  He might be a candidate for a spinal cord stimulator or an intrathecal pain pump.  I have recommended that he discuss some of these options with his pain provider.  I have also discussed the importance of smoking cessation as it relates to the degenerative changes in his back.    Patient or patient representative verbalized consent for the use of Ambient Listening during the visit with  Fer Flores MD for chart documentation. 4/11/2025  10:52 EDT    Scribed for Fer Flores MD by Sherry Anthony CMA on 4/11/2025 10:52 EDT       I, Dr. Flores, personally performed the services described in the documentation, as scribed in my presence, and it is both accurate and complete.

## 2025-07-15 DIAGNOSIS — M54.9 MECHANICAL BACK PAIN: Primary | ICD-10-CM

## 2025-08-05 ENCOUNTER — OFFICE VISIT (OUTPATIENT)
Dept: NEUROSURGERY | Facility: CLINIC | Age: 60
End: 2025-08-05
Payer: MEDICAID

## 2025-08-05 ENCOUNTER — PREP FOR SURGERY (OUTPATIENT)
Dept: OTHER | Facility: HOSPITAL | Age: 60
End: 2025-08-05
Payer: MEDICAID

## 2025-08-05 VITALS — TEMPERATURE: 97.7 F | HEIGHT: 70 IN | WEIGHT: 228.6 LBS | BODY MASS INDEX: 32.73 KG/M2

## 2025-08-05 DIAGNOSIS — M51.9 LUMBAR DISC DISEASE: ICD-10-CM

## 2025-08-05 DIAGNOSIS — M54.9 MECHANICAL BACK PAIN: Primary | ICD-10-CM

## 2025-08-05 DIAGNOSIS — Z72.0 TOBACCO ABUSE: ICD-10-CM

## 2025-08-05 DIAGNOSIS — M54.59 MECHANICAL LOW BACK PAIN: Primary | ICD-10-CM

## 2025-08-05 PROCEDURE — 1159F MED LIST DOCD IN RCRD: CPT | Performed by: NEUROLOGICAL SURGERY

## 2025-08-05 PROCEDURE — 1160F RVW MEDS BY RX/DR IN RCRD: CPT | Performed by: NEUROLOGICAL SURGERY

## 2025-08-05 PROCEDURE — 99214 OFFICE O/P EST MOD 30 MIN: CPT | Performed by: NEUROLOGICAL SURGERY

## 2025-08-05 RX ORDER — SENNOSIDES 8.6 MG
CAPSULE ORAL
COMMUNITY

## 2025-08-05 RX ORDER — LOSARTAN POTASSIUM AND HYDROCHLOROTHIAZIDE 25; 100 MG/1; MG/1
TABLET ORAL
COMMUNITY

## 2025-08-05 RX ORDER — FAMOTIDINE 20 MG/1
20 TABLET, FILM COATED ORAL
OUTPATIENT
Start: 2025-08-05

## 2025-08-05 RX ORDER — CHLORHEXIDINE GLUCONATE 40 MG/ML
SOLUTION TOPICAL
Qty: 120 ML | Refills: 0 | Status: SHIPPED | OUTPATIENT
Start: 2025-08-05